# Patient Record
Sex: FEMALE | Race: WHITE
[De-identification: names, ages, dates, MRNs, and addresses within clinical notes are randomized per-mention and may not be internally consistent; named-entity substitution may affect disease eponyms.]

---

## 2017-07-27 ENCOUNTER — APPOINTMENT (OUTPATIENT)
Dept: CARDIOLOGY | Facility: CLINIC | Age: 22
End: 2017-07-27

## 2017-10-30 ENCOUNTER — EMERGENCY (EMERGENCY)
Facility: HOSPITAL | Age: 22
LOS: 0 days | Discharge: HOME | End: 2017-10-30
Attending: EMERGENCY MEDICINE | Admitting: EMERGENCY MEDICINE
Payer: MEDICAID

## 2017-10-30 VITALS
RESPIRATION RATE: 18 BRPM | TEMPERATURE: 98 F | DIASTOLIC BLOOD PRESSURE: 76 MMHG | OXYGEN SATURATION: 98 % | HEART RATE: 79 BPM | SYSTOLIC BLOOD PRESSURE: 122 MMHG

## 2017-10-30 VITALS — HEIGHT: 64 IN | WEIGHT: 259.93 LBS

## 2017-10-30 DIAGNOSIS — O9A.211 INJURY, POISONING AND CERTAIN OTHER CONSEQUENCES OF EXTERNAL CAUSES COMPLICATING PREGNANCY, FIRST TRIMESTER: ICD-10-CM

## 2017-10-30 DIAGNOSIS — S39.011A STRAIN OF MUSCLE, FASCIA AND TENDON OF ABDOMEN, INITIAL ENCOUNTER: ICD-10-CM
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
## 2017-10-30 PROCEDURE — 99283 EMERGENCY DEPT VISIT LOW MDM: CPT

## 2017-10-30 RX ORDER — ACETAMINOPHEN 500 MG
650 TABLET ORAL ONCE
Qty: 0 | Refills: 0 | Status: COMPLETED | OUTPATIENT
Start: 2017-10-30 | End: 2017-10-30

## 2017-10-30 RX ADMIN — Medication 650 MILLIGRAM(S): at 12:56

## 2017-10-30 NOTE — ED STATDOCS - PROGRESS NOTE DETAILS
21 yo female presents with lower abd pain s/p placing a pt into an wheelchair and didn't know the brakes were not applied and pulled her abdominal muscle causing pain. Pt thinks shes pregnant abc she took a home preg test which as positive this morning. -David Deleon PA-C

## 2017-10-30 NOTE — ED STATDOCS - MEDICAL DECISION MAKING DETAILS
Pt presents with back pain, likely musculoskeletal. Plan to check UA and give Tylenol for pain control.

## 2017-10-30 NOTE — ED STATDOCS - CARE PLAN
Principal Discharge DX:	Abdominal pain, unspecified abdominal location  Secondary Diagnosis:	Muscle strain Principal Discharge DX:	Abdominal pain, unspecified abdominal location  Secondary Diagnosis:	Muscle strain  Secondary Diagnosis:	Less than 8 weeks gestation of pregnancy

## 2017-10-30 NOTE — ED STATDOCS - OBJECTIVE STATEMENT
21 y/o F with no PMHx. Pt was helping a heavy Pt into a wheelchair yesterday and starting experiencing some back pain. Pain is exacerbated with movement. Pt took a home pregnancy test this morning and it was positive. LNMP was a month ago. NKDA. Dr. Sargent, PMD.

## 2018-01-03 ENCOUNTER — APPOINTMENT (OUTPATIENT)
Dept: ANTEPARTUM | Facility: CLINIC | Age: 23
End: 2018-01-03
Payer: COMMERCIAL

## 2018-01-03 ENCOUNTER — ASOB RESULT (OUTPATIENT)
Age: 23
End: 2018-01-03

## 2018-01-03 PROCEDURE — 76813 OB US NUCHAL MEAS 1 GEST: CPT

## 2018-01-03 PROCEDURE — 76801 OB US < 14 WKS SINGLE FETUS: CPT

## 2018-02-21 ENCOUNTER — ASOB RESULT (OUTPATIENT)
Age: 23
End: 2018-02-21

## 2018-02-21 ENCOUNTER — APPOINTMENT (OUTPATIENT)
Dept: ANTEPARTUM | Facility: CLINIC | Age: 23
End: 2018-02-21
Payer: MEDICAID

## 2018-02-21 PROCEDURE — 76811 OB US DETAILED SNGL FETUS: CPT

## 2018-02-21 PROCEDURE — 76817 TRANSVAGINAL US OBSTETRIC: CPT

## 2018-05-22 ENCOUNTER — ASOB RESULT (OUTPATIENT)
Age: 23
End: 2018-05-22

## 2018-05-22 ENCOUNTER — APPOINTMENT (OUTPATIENT)
Dept: ANTEPARTUM | Facility: CLINIC | Age: 23
End: 2018-05-22
Payer: MEDICAID

## 2018-05-22 PROCEDURE — 76816 OB US FOLLOW-UP PER FETUS: CPT

## 2018-05-22 PROCEDURE — 76820 UMBILICAL ARTERY ECHO: CPT

## 2018-06-25 ENCOUNTER — ASOB RESULT (OUTPATIENT)
Age: 23
End: 2018-06-25

## 2018-06-25 ENCOUNTER — APPOINTMENT (OUTPATIENT)
Dept: ANTEPARTUM | Facility: CLINIC | Age: 23
End: 2018-06-25
Payer: MEDICAID

## 2018-06-25 PROCEDURE — 76816 OB US FOLLOW-UP PER FETUS: CPT

## 2018-06-25 PROCEDURE — 76819 FETAL BIOPHYS PROFIL W/O NST: CPT

## 2018-06-25 PROCEDURE — 76820 UMBILICAL ARTERY ECHO: CPT

## 2019-02-23 ENCOUNTER — EMERGENCY (EMERGENCY)
Facility: HOSPITAL | Age: 24
LOS: 0 days | Discharge: HOME | End: 2019-02-23
Attending: EMERGENCY MEDICINE | Admitting: EMERGENCY MEDICINE
Payer: MEDICAID

## 2019-02-23 VITALS
SYSTOLIC BLOOD PRESSURE: 114 MMHG | OXYGEN SATURATION: 100 % | DIASTOLIC BLOOD PRESSURE: 64 MMHG | RESPIRATION RATE: 18 BRPM | TEMPERATURE: 98 F | HEART RATE: 71 BPM

## 2019-02-23 VITALS — WEIGHT: 270.07 LBS | HEIGHT: 64 IN

## 2019-02-23 DIAGNOSIS — R10.13 EPIGASTRIC PAIN: ICD-10-CM

## 2019-02-23 DIAGNOSIS — O26.899 OTHER SPECIFIED PREGNANCY RELATED CONDITIONS, UNSPECIFIED TRIMESTER: ICD-10-CM

## 2019-02-23 DIAGNOSIS — Z3A.01 LESS THAN 8 WEEKS GESTATION OF PREGNANCY: ICD-10-CM

## 2019-02-23 LAB
ALBUMIN SERPL ELPH-MCNC: 3.3 G/DL — SIGNIFICANT CHANGE UP (ref 3.3–5)
ALP SERPL-CCNC: 100 U/L — SIGNIFICANT CHANGE UP (ref 40–120)
ALT FLD-CCNC: 54 U/L — SIGNIFICANT CHANGE UP (ref 12–78)
ANION GAP SERPL CALC-SCNC: 10 MMOL/L — SIGNIFICANT CHANGE UP (ref 5–17)
APPEARANCE UR: CLEAR — SIGNIFICANT CHANGE UP
APTT BLD: 33.3 SEC — SIGNIFICANT CHANGE UP (ref 27.5–36.3)
AST SERPL-CCNC: 41 U/L — HIGH (ref 15–37)
BACTERIA # UR AUTO: ABNORMAL
BASOPHILS # BLD AUTO: 0.07 K/UL — SIGNIFICANT CHANGE UP (ref 0–0.2)
BASOPHILS NFR BLD AUTO: 0.4 % — SIGNIFICANT CHANGE UP (ref 0–2)
BILIRUB SERPL-MCNC: 0.5 MG/DL — SIGNIFICANT CHANGE UP (ref 0.2–1.2)
BILIRUB UR-MCNC: NEGATIVE — SIGNIFICANT CHANGE UP
BUN SERPL-MCNC: 10 MG/DL — SIGNIFICANT CHANGE UP (ref 7–23)
CALCIUM SERPL-MCNC: 8.6 MG/DL — SIGNIFICANT CHANGE UP (ref 8.5–10.1)
CHLORIDE SERPL-SCNC: 106 MMOL/L — SIGNIFICANT CHANGE UP (ref 96–108)
CO2 SERPL-SCNC: 23 MMOL/L — SIGNIFICANT CHANGE UP (ref 22–31)
COLOR SPEC: YELLOW — SIGNIFICANT CHANGE UP
CREAT SERPL-MCNC: 0.81 MG/DL — SIGNIFICANT CHANGE UP (ref 0.5–1.3)
DIFF PNL FLD: ABNORMAL
EOSINOPHIL # BLD AUTO: 0.03 K/UL — SIGNIFICANT CHANGE UP (ref 0–0.5)
EOSINOPHIL NFR BLD AUTO: 0.2 % — SIGNIFICANT CHANGE UP (ref 0–6)
GLUCOSE SERPL-MCNC: 110 MG/DL — HIGH (ref 70–99)
GLUCOSE UR QL: NEGATIVE MG/DL — SIGNIFICANT CHANGE UP
HCG SERPL-ACNC: 8196.47 MIU/ML — SIGNIFICANT CHANGE UP
HCT VFR BLD CALC: 41.3 % — SIGNIFICANT CHANGE UP (ref 34.5–45)
HGB BLD-MCNC: 13.2 G/DL — SIGNIFICANT CHANGE UP (ref 11.5–15.5)
IMM GRANULOCYTES NFR BLD AUTO: 0.6 % — SIGNIFICANT CHANGE UP (ref 0–1.5)
INR BLD: 1.06 RATIO — SIGNIFICANT CHANGE UP (ref 0.88–1.16)
KETONES UR-MCNC: ABNORMAL
LEUKOCYTE ESTERASE UR-ACNC: ABNORMAL
LIDOCAIN IGE QN: 108 U/L — SIGNIFICANT CHANGE UP (ref 73–393)
LYMPHOCYTES # BLD AUTO: 17.4 % — SIGNIFICANT CHANGE UP (ref 13–44)
LYMPHOCYTES # BLD AUTO: 3.15 K/UL — SIGNIFICANT CHANGE UP (ref 1–3.3)
MCHC RBC-ENTMCNC: 28 PG — SIGNIFICANT CHANGE UP (ref 27–34)
MCHC RBC-ENTMCNC: 32 GM/DL — SIGNIFICANT CHANGE UP (ref 32–36)
MCV RBC AUTO: 87.7 FL — SIGNIFICANT CHANGE UP (ref 80–100)
MONOCYTES # BLD AUTO: 0.89 K/UL — SIGNIFICANT CHANGE UP (ref 0–0.9)
MONOCYTES NFR BLD AUTO: 4.9 % — SIGNIFICANT CHANGE UP (ref 2–14)
NEUTROPHILS # BLD AUTO: 13.84 K/UL — HIGH (ref 1.8–7.4)
NEUTROPHILS NFR BLD AUTO: 76.5 % — SIGNIFICANT CHANGE UP (ref 43–77)
NITRITE UR-MCNC: NEGATIVE — SIGNIFICANT CHANGE UP
NRBC # BLD: 0 /100 WBCS — SIGNIFICANT CHANGE UP (ref 0–0)
PH UR: 6 — SIGNIFICANT CHANGE UP (ref 5–8)
PLATELET # BLD AUTO: 432 K/UL — HIGH (ref 150–400)
POTASSIUM SERPL-MCNC: 4 MMOL/L — SIGNIFICANT CHANGE UP (ref 3.5–5.3)
POTASSIUM SERPL-SCNC: 4 MMOL/L — SIGNIFICANT CHANGE UP (ref 3.5–5.3)
PROT SERPL-MCNC: 8 GM/DL — SIGNIFICANT CHANGE UP (ref 6–8.3)
PROT UR-MCNC: 15 MG/DL
PROTHROM AB SERPL-ACNC: 11.8 SEC — SIGNIFICANT CHANGE UP (ref 10–12.9)
RBC # BLD: 4.71 M/UL — SIGNIFICANT CHANGE UP (ref 3.8–5.2)
RBC # FLD: 14.7 % — HIGH (ref 10.3–14.5)
RBC CASTS # UR COMP ASSIST: ABNORMAL /HPF (ref 0–4)
SODIUM SERPL-SCNC: 139 MMOL/L — SIGNIFICANT CHANGE UP (ref 135–145)
SP GR SPEC: 1.02 — SIGNIFICANT CHANGE UP (ref 1.01–1.02)
UROBILINOGEN FLD QL: NEGATIVE MG/DL — SIGNIFICANT CHANGE UP
WBC # BLD: 18.09 K/UL — HIGH (ref 3.8–10.5)
WBC # FLD AUTO: 18.09 K/UL — HIGH (ref 3.8–10.5)
WBC UR QL: SIGNIFICANT CHANGE UP

## 2019-02-23 PROCEDURE — 76815 OB US LIMITED FETUS(S): CPT | Mod: 26

## 2019-02-23 PROCEDURE — 99285 EMERGENCY DEPT VISIT HI MDM: CPT | Mod: 25

## 2019-02-23 PROCEDURE — 76705 ECHO EXAM OF ABDOMEN: CPT | Mod: 26

## 2019-02-23 RX ORDER — SODIUM CHLORIDE 9 MG/ML
1000 INJECTION INTRAMUSCULAR; INTRAVENOUS; SUBCUTANEOUS ONCE
Qty: 0 | Refills: 0 | Status: COMPLETED | OUTPATIENT
Start: 2019-02-23 | End: 2019-02-23

## 2019-02-23 RX ORDER — ACETAMINOPHEN 500 MG
650 TABLET ORAL ONCE
Qty: 0 | Refills: 0 | Status: COMPLETED | OUTPATIENT
Start: 2019-02-23 | End: 2019-02-23

## 2019-02-23 RX ORDER — FAMOTIDINE 10 MG/ML
20 INJECTION INTRAVENOUS ONCE
Qty: 0 | Refills: 0 | Status: COMPLETED | OUTPATIENT
Start: 2019-02-23 | End: 2019-02-23

## 2019-02-23 RX ADMIN — SODIUM CHLORIDE 1000 MILLILITER(S): 9 INJECTION INTRAMUSCULAR; INTRAVENOUS; SUBCUTANEOUS at 13:07

## 2019-02-23 RX ADMIN — SODIUM CHLORIDE 1000 MILLILITER(S): 9 INJECTION INTRAMUSCULAR; INTRAVENOUS; SUBCUTANEOUS at 14:07

## 2019-02-23 RX ADMIN — FAMOTIDINE 20 MILLIGRAM(S): 10 INJECTION INTRAVENOUS at 13:07

## 2019-02-23 RX ADMIN — Medication 650 MILLIGRAM(S): at 13:08

## 2019-02-23 NOTE — ED ADULT TRIAGE NOTE - CHIEF COMPLAINT QUOTE
pt reports to ed ambulating with steady gait. pt c/o severe upper/mid abdominal pain that started 40 minutes ago. pt reports vomiting x2, no relief. pt reports being pregnant uknown due date/weeks pregnant at this time as per pt. pt gave birth 7 months ago via . pt denies fever,chills, diarrhea, sob, chest pain.

## 2019-02-23 NOTE — ED STATDOCS - OBJECTIVE STATEMENT
22 y/o female with PMHx of appy, s/p  presents to the ED c/o RUQ abd pain. Reports that pain aggravated after PO intake. No prior episodes. LNMP in December. Pt 8 weeks pregnant. Pt post-partum 7 months. .

## 2019-02-23 NOTE — ED STATDOCS - PROGRESS NOTE DETAILS
LINDEN Lamar:   Patient has been seen, evaluated and orders have been written by the attending in intake. Patient is stable.  I will follow up the results of orders written and I will continue to evaluate/observe the patient.  .cristopher e No pain at DC.  Neg. vaginal bleeding or pelvic pain.  Neg. N/V.  Pt. instructed to return to ED for any worsening symptoms.  Leny Lamar PA-C No pain at DC.  Neg. vaginal bleeding or pelvic pain.  Neg. N/V.  Pt. instructed to return to ED for any worsening symptoms.  Copies of US and labs provided to patient.  Leny Lamar PA-C Pt. has appt. with ob in two days.  Leny Lamar PA-C

## 2019-02-23 NOTE — ED STATDOCS - CLINICAL SUMMARY MEDICAL DECISION MAKING FREE TEXT BOX
Patient with epigastric pain.  US no signs of biliary disease.  US OB shows single live IUG.  WBC 18, but no other signs of infection.  Pt treated with tylenol and pepcid, with improvement in pain on reexam.  Question 2/2 gastritis.  Okay for d/c home with syptomatic care and f/u closely with PMD, and OB.

## 2019-02-24 LAB
CULTURE RESULTS: SIGNIFICANT CHANGE UP
SPECIMEN SOURCE: SIGNIFICANT CHANGE UP

## 2019-04-01 ENCOUNTER — ASOB RESULT (OUTPATIENT)
Age: 24
End: 2019-04-01

## 2019-04-01 ENCOUNTER — APPOINTMENT (OUTPATIENT)
Age: 24
End: 2019-04-01
Payer: MEDICAID

## 2019-04-01 PROCEDURE — 76813 OB US NUCHAL MEAS 1 GEST: CPT | Mod: TH

## 2019-04-01 PROCEDURE — 76801 OB US < 14 WKS SINGLE FETUS: CPT | Mod: TH

## 2019-06-04 ENCOUNTER — APPOINTMENT (OUTPATIENT)
Dept: ANTEPARTUM | Facility: CLINIC | Age: 24
End: 2019-06-04
Payer: MEDICAID

## 2019-06-04 ENCOUNTER — ASOB RESULT (OUTPATIENT)
Age: 24
End: 2019-06-04

## 2019-06-04 PROCEDURE — 76811 OB US DETAILED SNGL FETUS: CPT

## 2019-06-04 PROCEDURE — 76817 TRANSVAGINAL US OBSTETRIC: CPT

## 2019-06-11 ENCOUNTER — ASOB RESULT (OUTPATIENT)
Age: 24
End: 2019-06-11

## 2019-06-11 ENCOUNTER — APPOINTMENT (OUTPATIENT)
Dept: ANTEPARTUM | Facility: CLINIC | Age: 24
End: 2019-06-11
Payer: MEDICAID

## 2019-06-11 PROCEDURE — 76816 OB US FOLLOW-UP PER FETUS: CPT

## 2019-06-13 ENCOUNTER — APPOINTMENT (OUTPATIENT)
Dept: PEDIATRIC CARDIOLOGY | Facility: CLINIC | Age: 24
End: 2019-06-13
Payer: MEDICAID

## 2019-06-13 DIAGNOSIS — Z3A.21 21 WEEKS GESTATION OF PREGNANCY: ICD-10-CM

## 2019-06-13 PROCEDURE — 99204 OFFICE O/P NEW MOD 45 MIN: CPT | Mod: 25

## 2019-06-13 PROCEDURE — 93325 DOPPLER ECHO COLOR FLOW MAPG: CPT | Mod: 59

## 2019-06-13 PROCEDURE — 76821 MIDDLE CEREBRAL ARTERY ECHO: CPT | Mod: 59

## 2019-06-13 PROCEDURE — 76827 ECHO EXAM OF FETAL HEART: CPT

## 2019-06-13 PROCEDURE — 76825 ECHO EXAM OF FETAL HEART: CPT

## 2019-06-13 PROCEDURE — 76820 UMBILICAL ARTERY ECHO: CPT | Mod: 59

## 2019-06-13 RX ORDER — ELASTIC BANDAGE 2"X2.2YD
BANDAGE TOPICAL
Refills: 0 | Status: ACTIVE | COMMUNITY

## 2019-07-11 ENCOUNTER — APPOINTMENT (OUTPATIENT)
Dept: PEDIATRIC CARDIOLOGY | Facility: CLINIC | Age: 24
End: 2019-07-11

## 2019-07-19 ENCOUNTER — APPOINTMENT (OUTPATIENT)
Dept: PEDIATRIC CARDIOLOGY | Facility: CLINIC | Age: 24
End: 2019-07-19
Payer: MEDICAID

## 2019-07-19 DIAGNOSIS — O99.212 OBESITY COMPLICATING PREGNANCY, SECOND TRIMESTER: ICD-10-CM

## 2019-07-19 DIAGNOSIS — O35.2XX0 MATERNAL CARE FOR (SUSPECTED) HEREDITARY DISEASE IN FETUS, NOT APPLICABLE OR UNSPECIFIED: ICD-10-CM

## 2019-07-19 DIAGNOSIS — Z3A.26 26 WEEKS GESTATION OF PREGNANCY: ICD-10-CM

## 2019-07-19 DIAGNOSIS — O28.5 ABNORMAL CHROMOSOMAL AND GENETIC FINDING ON ANTENATAL SCREENING OF MOTHER: ICD-10-CM

## 2019-07-19 DIAGNOSIS — O35.9XX0 MATERNAL CARE FOR (SUSPECTED) FETAL ABNORMALITY AND DAMAGE, UNSPECIFIED, NOT APPLICABLE OR UNSPECIFIED: ICD-10-CM

## 2019-07-19 PROCEDURE — 76821 MIDDLE CEREBRAL ARTERY ECHO: CPT

## 2019-07-19 PROCEDURE — 76826 ECHO EXAM OF FETAL HEART: CPT

## 2019-07-19 PROCEDURE — 76820 UMBILICAL ARTERY ECHO: CPT

## 2019-07-19 PROCEDURE — 93325 DOPPLER ECHO COLOR FLOW MAPG: CPT | Mod: 59

## 2019-07-19 PROCEDURE — 76828 ECHO EXAM OF FETAL HEART: CPT

## 2019-07-19 PROCEDURE — 99214 OFFICE O/P EST MOD 30 MIN: CPT | Mod: 25

## 2019-09-11 ENCOUNTER — ASOB RESULT (OUTPATIENT)
Age: 24
End: 2019-09-11

## 2019-09-11 ENCOUNTER — APPOINTMENT (OUTPATIENT)
Dept: ANTEPARTUM | Facility: CLINIC | Age: 24
End: 2019-09-11
Payer: MEDICAID

## 2019-09-11 PROCEDURE — 76817 TRANSVAGINAL US OBSTETRIC: CPT

## 2019-09-11 PROCEDURE — 76819 FETAL BIOPHYS PROFIL W/O NST: CPT

## 2019-09-11 PROCEDURE — 76816 OB US FOLLOW-UP PER FETUS: CPT

## 2019-09-11 PROCEDURE — 76821 MIDDLE CEREBRAL ARTERY ECHO: CPT

## 2019-09-20 ENCOUNTER — ASOB RESULT (OUTPATIENT)
Age: 24
End: 2019-09-20

## 2019-09-20 ENCOUNTER — APPOINTMENT (OUTPATIENT)
Dept: ANTEPARTUM | Facility: CLINIC | Age: 24
End: 2019-09-20
Payer: MEDICAID

## 2019-09-20 PROCEDURE — 76819 FETAL BIOPHYS PROFIL W/O NST: CPT

## 2019-09-20 PROCEDURE — 76816 OB US FOLLOW-UP PER FETUS: CPT

## 2019-09-25 ENCOUNTER — ASOB RESULT (OUTPATIENT)
Age: 24
End: 2019-09-25

## 2019-09-25 ENCOUNTER — APPOINTMENT (OUTPATIENT)
Dept: ANTEPARTUM | Facility: CLINIC | Age: 24
End: 2019-09-25
Payer: MEDICAID

## 2019-09-25 PROCEDURE — 76816 OB US FOLLOW-UP PER FETUS: CPT

## 2019-09-25 PROCEDURE — 76819 FETAL BIOPHYS PROFIL W/O NST: CPT

## 2019-10-02 ENCOUNTER — ASOB RESULT (OUTPATIENT)
Age: 24
End: 2019-10-02

## 2019-10-02 ENCOUNTER — APPOINTMENT (OUTPATIENT)
Dept: ANTEPARTUM | Facility: CLINIC | Age: 24
End: 2019-10-02
Payer: MEDICAID

## 2019-10-02 PROCEDURE — 76821 MIDDLE CEREBRAL ARTERY ECHO: CPT

## 2019-10-02 PROCEDURE — 76819 FETAL BIOPHYS PROFIL W/O NST: CPT

## 2019-10-08 ENCOUNTER — APPOINTMENT (OUTPATIENT)
Dept: ANTEPARTUM | Facility: CLINIC | Age: 24
End: 2019-10-08
Payer: MEDICAID

## 2019-10-08 ENCOUNTER — ASOB RESULT (OUTPATIENT)
Age: 24
End: 2019-10-08

## 2019-10-08 PROCEDURE — 76821 MIDDLE CEREBRAL ARTERY ECHO: CPT

## 2019-10-08 PROCEDURE — 76816 OB US FOLLOW-UP PER FETUS: CPT

## 2019-10-08 PROCEDURE — 76819 FETAL BIOPHYS PROFIL W/O NST: CPT

## 2020-02-17 NOTE — ED STATDOCS - NS ED ATTENDING STATEMENT MOD
Patient I have personally performed a face to face diagnostic evaluation on this patient. I have reviewed the ACP note and agree with the history, exam and plan of care, except as noted.

## 2021-03-12 NOTE — ED STATDOCS - PRINCIPAL DIAGNOSIS
refer to Dr. Chavez Moser for pain management. Include Z78.9 (Other Specified Conditions Influencing Health Status) As An Associated Diagnosis?: No Detail Level: Detailed Medical Necessity Information: It is in your best interest to select a reason for this procedure from the list below. All of these items fulfill various CMS LCD requirements except the new and changing color options. Post-Care Instructions: I reviewed with the patient in detail post-care instructions. Patient is to wear sunprotection, and avoid picking at any of the treated lesions. Pt may apply Vaseline to crusted or scabbing areas. Medical Necessity Clause: This procedure was medically necessary because the lesions that were treated were: itchy Consent: The patient's consent was obtained including but not limited to risks of crusting, scabbing, blistering, scarring, darker or lighter pigmentary change, recurrence, incomplete removal and infection. Epigastric pain

## 2021-12-02 ENCOUNTER — EMERGENCY (EMERGENCY)
Facility: HOSPITAL | Age: 26
LOS: 0 days | Discharge: HOME | End: 2021-12-02
Attending: EMERGENCY MEDICINE
Payer: MEDICAID

## 2021-12-02 VITALS
HEIGHT: 64 IN | OXYGEN SATURATION: 99 % | HEART RATE: 78 BPM | WEIGHT: 179.9 LBS | RESPIRATION RATE: 18 BRPM | TEMPERATURE: 97 F | DIASTOLIC BLOOD PRESSURE: 68 MMHG | SYSTOLIC BLOOD PRESSURE: 109 MMHG

## 2021-12-02 DIAGNOSIS — S92.242A DISPLACED FRACTURE OF MEDIAL CUNEIFORM OF LEFT FOOT, INITIAL ENCOUNTER FOR CLOSED FRACTURE: ICD-10-CM

## 2021-12-02 DIAGNOSIS — K59.00 CONSTIPATION, UNSPECIFIED: ICD-10-CM

## 2021-12-02 DIAGNOSIS — M79.672 PAIN IN LEFT FOOT: ICD-10-CM

## 2021-12-02 DIAGNOSIS — Z87.19 PERSONAL HISTORY OF OTHER DISEASES OF THE DIGESTIVE SYSTEM: ICD-10-CM
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
## 2021-12-02 PROCEDURE — 73630 X-RAY EXAM OF FOOT: CPT | Mod: 26,LT

## 2021-12-02 PROCEDURE — 99284 EMERGENCY DEPT VISIT MOD MDM: CPT | Mod: 25

## 2021-12-02 PROCEDURE — 29515 APPLICATION SHORT LEG SPLINT: CPT | Mod: LT

## 2021-12-02 PROCEDURE — 99283 EMERGENCY DEPT VISIT LOW MDM: CPT | Mod: 25

## 2021-12-02 PROCEDURE — 29515 APPLICATION SHORT LEG SPLINT: CPT

## 2021-12-02 PROCEDURE — 73630 X-RAY EXAM OF FOOT: CPT | Mod: LT

## 2021-12-02 RX ORDER — IBUPROFEN 200 MG
600 TABLET ORAL ONCE
Refills: 0 | Status: COMPLETED | OUTPATIENT
Start: 2021-12-02 | End: 2021-12-02

## 2021-12-02 RX ADMIN — Medication 600 MILLIGRAM(S): at 21:05

## 2021-12-02 NOTE — ED STATDOCS - PROGRESS NOTE DETAILS
Reviewed results of X-ray left foot with patient. + fracture of left cuneiform. Posterior splint applied to left lower extremity. Crutch walking instructions given. Je DEXTER Agreed to F/U with Orthopedic. Je DEXTER

## 2021-12-02 NOTE — ED STATDOCS - CARE PROVIDERS DIRECT ADDRESSES
[email or web address]

## 2021-12-02 NOTE — ED STATDOCS - NSFOLLOWUPINSTRUCTIONS_ED_ALL_ED_FT
Rest. Elevate affected foot.   Maintain splint to left foot.  Keep lean and dry.  Ibuprofen 600 mg. PO every 6 hrs. for pain.  Follow up with Podiatrist or Orthopedic.                   Tarsal Fracture       A tarsal fracture is a break in one of the bones that are located in the middle and back of your foot (tarsals). There are seven tarsal bones in your foot that make up your heel, the arch of your foot, and connections to the long bones of your toes.  Types of tarsal fractures include:  •Cracks in a bone (stress fracture).      •A broken bone that has not moved out of place (nondisplaced fracture).      •A broken bone that has moved out of place (displaced fracture).      •Breaks that result in three or more bone pieces (comminuted fracture).        What are the causes?  This condition may be caused by:  •Repeated stress on the tarsal bones over time.      •An injury that forcefully twists your foot.      •Falling from a height.      •A hard, direct hit or a crushing injury to your foot.        What increases the risk?  You are more likely to develop this condition if:  •You have weak bones (osteopenia or osteoporosis).      •You start a new athletic activity or try to advance too quickly.      •You play certain sports, such as soccer, basketball, gymnastics, or football.        What are the signs or symptoms?  Foot pain is the most common symptom of this condition. The pain is generally:  •Achy, dull, or vague.      •Better with rest and worse with activity.      •Increased when you lean your body weight on your foot or rise up on tiptoe.    Other symptoms include:  •Swelling.      •Bruising.      •Pain when pressing on your foot (tenderness).        How is this diagnosed?  This condition may be diagnosed with:  •Medical history.      •Symptoms.      •Physical exam.      •Imaging tests, such as X-ray and an MRI.        How is this treated?  Treatment for this condition depends on the severity of the injury.•For stress and nondisplaced fractures, you may be given:  •A boot or cast.      •Crutches.      •Physical therapy.      •Medicine for pain.      •For displaced and comminuted fractures, you may have:  •Surgery.      •A cast.      •Physical therapy.          Follow these instructions at home:    If you have a boot:     •Wear it as told by your health care provider. Remove it only as told by your health care provider.      • Loosen it if your toes tingle, become numb, or turn cold and blue.      •Keep it clean and dry.      If you have a cast:     • Do not put pressure on any part of the cast until it is fully hardened. This may take several hours.      • Do not stick anything inside the cast to scratch your skin. Doing that increases your risk of infection.      •Check the skin around the cast every day. Tell your health care provider about any concerns.       • You may put lotion on dry skin around the edges of the cast. Do not put lotion on the skin underneath the cast.      •Keep it clean and dry.      Bathing     • Do not take baths, swim, or use a hot tub until your health care provider approves.    •If the boot or cast is not waterproof:  •Do not let it get wet.      •Cover it with a waterproof covering when you take a bath or shower.        Activity     • Do not use the injured limb to support your body weight until your health care provider says that you can. Use your crutches as told by your health care provider.      •Ask your health care provider when it is safe to drive if you have a boot or cast on your foot.      •Do exercises as told by your health care provider.      •Return to your normal activities as told by your health care provider. Ask your health care provider what activities are safe for you.        Managing pain, stiffness, and swelling    •If directed, put ice on the injured area.  •Put ice in a plastic bag.      •Place a towel between the bag and your boot, cast, or skin.      •Leave the ice on for 20 minutes, 2–3 times a day.        •Move your toes often to reduce stiffness and swelling.      •Raise (elevate) the injured area above the level of your heart while you are sitting or lying down.      General instructions     •Take over-the-counter and prescription medicines only as told by your health care provider.    •Ask your health care provider if the medicine prescribed to you:  •Requires you to avoid driving or using heavy machinery.    •Can cause constipation. You may need to take actions to prevent or treat constipation, such as:  •Drink enough fluid to keep your urine pale yellow.      •Take over-the-counter or prescription medicines.      •Eat foods that are high in fiber, such as beans, whole grains, and fresh fruits and vegetables.      •Limit foods that are high in fat and processed sugars, such as fried or sweet foods.          • Do not use any products that contain nicotine or tobacco, such as cigarettes, e-cigarettes, and chewing tobacco. These can delay bone healing. If you need help quitting, ask your health care provider.      •Keep all follow-up visits as told by your health care provider. This is important.        How is this prevented?    •Wear comfortable and supportive footwear during activity.      •If you are starting a new activity, build your time or distance gradually.      •Make sure to use equipment that fits you.    •Maintain physical fitness, including:  •Strength.      •Flexibility.        •Do alternative physical activities (cross-train) to avoid overstressing one area of your body.      •Eat a balanced diet, including foods that have calcium and vitamin D. These include milk, beef liver, egg yolks, fatty fish, soybeans, dark leafy greens, cheese, and fortified cereals.        Contact a health care provider if:    •Your pain medicine is not helping.      •Your boot or cast gets damaged.        Get help right away if:    •You have severe pain.      •Your toes turn pale and cold or blue.      •You lose feeling in your toes.      •You have redness, warmth, and tenderness in the back of your leg.      •You have chest pain or difficulty breathing.        Summary    •A tarsal fracture is a break in one of the bones that are located in the middle and back of your foot (tarsals).      •It is caused by repeated stress, injury, a fall, or a direct hit to the foot.      •It is treated with a boot or cast, crutches, medicines, physical therapy, or surgery.      This information is not intended to replace advice given to you by your health care provider. Make sure you discuss any questions you have with your health care provider. Rest. Elevate affected foot.   Maintain splint to left foot.  Keep lean and dry.  Ibuprofen 600 mg. PO every 6 hrs. for pain.  Crutch Walking.  Follow up with Podiatrist or Orthopedic.                   Tarsal Fracture       A tarsal fracture is a break in one of the bones that are located in the middle and back of your foot (tarsals). There are seven tarsal bones in your foot that make up your heel, the arch of your foot, and connections to the long bones of your toes.  Types of tarsal fractures include:  •Cracks in a bone (stress fracture).      •A broken bone that has not moved out of place (nondisplaced fracture).      •A broken bone that has moved out of place (displaced fracture).      •Breaks that result in three or more bone pieces (comminuted fracture).        What are the causes?  This condition may be caused by:  •Repeated stress on the tarsal bones over time.      •An injury that forcefully twists your foot.      •Falling from a height.      •A hard, direct hit or a crushing injury to your foot.        What increases the risk?  You are more likely to develop this condition if:  •You have weak bones (osteopenia or osteoporosis).      •You start a new athletic activity or try to advance too quickly.      •You play certain sports, such as soccer, basketball, gymnastics, or football.        What are the signs or symptoms?  Foot pain is the most common symptom of this condition. The pain is generally:  •Achy, dull, or vague.      •Better with rest and worse with activity.      •Increased when you lean your body weight on your foot or rise up on tiptoe.    Other symptoms include:  •Swelling.      •Bruising.      •Pain when pressing on your foot (tenderness).        How is this diagnosed?  This condition may be diagnosed with:  •Medical history.      •Symptoms.      •Physical exam.      •Imaging tests, such as X-ray and an MRI.        How is this treated?  Treatment for this condition depends on the severity of the injury.•For stress and nondisplaced fractures, you may be given:  •A boot or cast.      •Crutches.      •Physical therapy.      •Medicine for pain.      •For displaced and comminuted fractures, you may have:  •Surgery.      •A cast.      •Physical therapy.          Follow these instructions at home:    If you have a boot:     •Wear it as told by your health care provider. Remove it only as told by your health care provider.      • Loosen it if your toes tingle, become numb, or turn cold and blue.      •Keep it clean and dry.      If you have a cast:     • Do not put pressure on any part of the cast until it is fully hardened. This may take several hours.      • Do not stick anything inside the cast to scratch your skin. Doing that increases your risk of infection.      •Check the skin around the cast every day. Tell your health care provider about any concerns.       • You may put lotion on dry skin around the edges of the cast. Do not put lotion on the skin underneath the cast.      •Keep it clean and dry.      Bathing     • Do not take baths, swim, or use a hot tub until your health care provider approves.    •If the boot or cast is not waterproof:  •Do not let it get wet.      •Cover it with a waterproof covering when you take a bath or shower.        Activity     • Do not use the injured limb to support your body weight until your health care provider says that you can. Use your crutches as told by your health care provider.      •Ask your health care provider when it is safe to drive if you have a boot or cast on your foot.      •Do exercises as told by your health care provider.      •Return to your normal activities as told by your health care provider. Ask your health care provider what activities are safe for you.        Managing pain, stiffness, and swelling    •If directed, put ice on the injured area.  •Put ice in a plastic bag.      •Place a towel between the bag and your boot, cast, or skin.      •Leave the ice on for 20 minutes, 2–3 times a day.        •Move your toes often to reduce stiffness and swelling.      •Raise (elevate) the injured area above the level of your heart while you are sitting or lying down.      General instructions     •Take over-the-counter and prescription medicines only as told by your health care provider.    •Ask your health care provider if the medicine prescribed to you:  •Requires you to avoid driving or using heavy machinery.    •Can cause constipation. You may need to take actions to prevent or treat constipation, such as:  •Drink enough fluid to keep your urine pale yellow.      •Take over-the-counter or prescription medicines.      •Eat foods that are high in fiber, such as beans, whole grains, and fresh fruits and vegetables.      •Limit foods that are high in fat and processed sugars, such as fried or sweet foods.          • Do not use any products that contain nicotine or tobacco, such as cigarettes, e-cigarettes, and chewing tobacco. These can delay bone healing. If you need help quitting, ask your health care provider.      •Keep all follow-up visits as told by your health care provider. This is important.        How is this prevented?    •Wear comfortable and supportive footwear during activity.      •If you are starting a new activity, build your time or distance gradually.      •Make sure to use equipment that fits you.    •Maintain physical fitness, including:  •Strength.      •Flexibility.        •Do alternative physical activities (cross-train) to avoid overstressing one area of your body.      •Eat a balanced diet, including foods that have calcium and vitamin D. These include milk, beef liver, egg yolks, fatty fish, soybeans, dark leafy greens, cheese, and fortified cereals.        Contact a health care provider if:    •Your pain medicine is not helping.      •Your boot or cast gets damaged.        Get help right away if:    •You have severe pain.      •Your toes turn pale and cold or blue.      •You lose feeling in your toes.      •You have redness, warmth, and tenderness in the back of your leg.      •You have chest pain or difficulty breathing.        Summary    •A tarsal fracture is a break in one of the bones that are located in the middle and back of your foot (tarsals).      •It is caused by repeated stress, injury, a fall, or a direct hit to the foot.      •It is treated with a boot or cast, crutches, medicines, physical therapy, or surgery.      This information is not intended to replace advice given to you by your health care provider. Make sure you discuss any questions you have with your health care provider.

## 2021-12-02 NOTE — ED STATDOCS - OBJECTIVE STATEMENT
27 y/o female with PMHx of appendicitis presents to the ED c/o left foot pain. Injured it getting out of car yesterday had been ambulatory. However today increased pain and unable to ambulate on it. Denies any other injuries or sx. Took Tylenol around 12pm.

## 2021-12-02 NOTE — ED STATDOCS - PATIENT PORTAL LINK FT
You can access the FollowMyHealth Patient Portal offered by [facility] by registering at the following website: [email or web address]. By joining [employer]’s FollowMyHealth portal, you will also be able to view your health information using other applications (apps) compatible with our system.

## 2021-12-02 NOTE — ED STATDOCS - CARE PROVIDER_API CALL
Vishnu Conrad (DO)  Orthopaedic Surgery  [address] [place] [address]  Phone: [phone]  Fax: [phone]  Follow Up Time:     Tabitha Davis  PODIATRIC MEDICINE AND SURGERY  [address], [address]  [place] [address]  Phone: [phone]  Fax: [phone]  Follow Up Time:

## 2021-12-04 ENCOUNTER — NON-APPOINTMENT (OUTPATIENT)
Age: 26
End: 2021-12-04

## 2021-12-07 ENCOUNTER — NON-APPOINTMENT (OUTPATIENT)
Age: 26
End: 2021-12-07

## 2021-12-08 ENCOUNTER — APPOINTMENT (OUTPATIENT)
Dept: ORTHOPEDIC SURGERY | Facility: CLINIC | Age: 26
End: 2021-12-08
Payer: MEDICAID

## 2021-12-08 DIAGNOSIS — S99.912A UNSPECIFIED INJURY OF LEFT ANKLE, INITIAL ENCOUNTER: ICD-10-CM

## 2021-12-08 DIAGNOSIS — M25.572 PAIN IN LEFT ANKLE AND JOINTS OF LEFT FOOT: ICD-10-CM

## 2021-12-08 PROCEDURE — 99204 OFFICE O/P NEW MOD 45 MIN: CPT

## 2021-12-08 NOTE — DISCUSSION/SUMMARY
[de-identified] : At this point in time I do want to go ahead with an MRI of the left ankle without contrast to evaluate for stress fracture of the navicular versus a posterior tibialis tendon tear.  The patient is having severe pain over the medial side of the ankle and concerned which is why I want to go ahead with the MRI.  The patient was provided with a Cam walking boot to help with her walking and pain.  In the interim for her pain she could use anti-inflammatories and Tylenol.  I do want her icing the area and elevating the area for swelling control.  Once the MRI is completed the patient is to follow-up in office to review.  I want her stopping all exercises until the MRI is completed and reviewed.  She understood the treatment course at this time.

## 2021-12-08 NOTE — PHYSICAL EXAM
[de-identified] : Left ankle Physical Examination:\par \par General: Alert and oriented x3.  In no acute distress.  Pleasant in nature with a normal affect.  No apparent respiratory distress. \par Erythema, Warmth, Rubor: Negative\par Swelling: Positive swelling on the medial side of the ankle over the posterior tibialis tendon and navicular area.\par \par ROM: With pain\par 1. Dorsiflexion: 10 degrees\par 2. Plantarflexion: 40 degrees\par 3. Inversion: 10 degrees\par 4. Eversion: 10 degrees\par \par Tenderness to Palpation: \par 1. Lateral Malleolus: Negative\par 2. Medial Malleolus: Negative\par 3. Proximal Fibular Pain: Negative\par 4. Heel Pain: Negative\par 5. Cuboid: Negative\par 6. Navicular: SEVERE tenderness to palpation over the navicular.\par 7. Tibiotalar Joint: Negative\par 8. Subtalar Joint: Negative\par 9. Posterior Recess: Negative\par \par Tendon Pain:\par 1. Achilles: Negative\par 2. Peroneals: Negative\par 3. Posterior Tibialis: SEVERE tenderness to palpation over the posterior tibialis tendon.\par 4. Tibialis Anterior: Negative\par \par Ligament Pain:\par 1. ATFL: Negative\par 2. CFL: Negative \par 3. PTFL: Negative\par 4. Deltoid Ligaments: Negative\par 5. Lis Franc Ligament: Negative\par \par Stability: \par 1. Anterior Drawer: Negative\par 2. Posterior Drawer: Negative\par \par Strength: 5/5 TA/GS/EHL\par \par Pulses: 2+ DP/PT Pulses\par \par Neuro: Intact motor and sensory\par \par Additional Test:\par 1. Calcaneal Squeeze Test: Negative\par 2. 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 Dec 3 2021 4:47PM

## 2021-12-15 ENCOUNTER — OUTPATIENT (OUTPATIENT)
Dept: OUTPATIENT SERVICES | Facility: HOSPITAL | Age: 26
LOS: 1 days | End: 2021-12-15
Payer: MEDICAID

## 2021-12-15 ENCOUNTER — APPOINTMENT (OUTPATIENT)
Dept: MRI IMAGING | Facility: CLINIC | Age: 26
End: 2021-12-15
Payer: MEDICAID

## 2021-12-15 DIAGNOSIS — S99.912A UNSPECIFIED INJURY OF LEFT ANKLE, INITIAL ENCOUNTER: ICD-10-CM

## 2021-12-15 DIAGNOSIS — M76.822 POSTERIOR TIBIAL TENDINITIS, LEFT LEG: ICD-10-CM

## 2021-12-15 DIAGNOSIS — M25.572 PAIN IN LEFT ANKLE AND JOINTS OF LEFT FOOT: ICD-10-CM

## 2021-12-15 PROCEDURE — 73721 MRI JNT OF LWR EXTRE W/O DYE: CPT

## 2021-12-15 PROCEDURE — 73721 MRI JNT OF LWR EXTRE W/O DYE: CPT | Mod: 26,LT

## 2021-12-20 ENCOUNTER — APPOINTMENT (OUTPATIENT)
Dept: ORTHOPEDIC SURGERY | Facility: CLINIC | Age: 26
End: 2021-12-20
Payer: MEDICAID

## 2021-12-20 DIAGNOSIS — M76.822 POSTERIOR TIBIAL TENDINITIS, LEFT LEG: ICD-10-CM

## 2021-12-20 PROCEDURE — 99214 OFFICE O/P EST MOD 30 MIN: CPT

## 2021-12-20 NOTE — PHYSICAL EXAM
[de-identified] : Left ankle Physical Examination:\par \par General: Alert and oriented x3.  In no acute distress.  Pleasant in nature with a normal affect.  No apparent respiratory distress. \par Erythema, Warmth, Rubor: Negative\par Swelling: Positive swelling on the medial side of the ankle over the posterior tibialis tendon and navicular area.\par \par ROM: With pain\par 1. Dorsiflexion: 10 degrees\par 2. Plantarflexion: 40 degrees\par 3. Inversion: 10 degrees\par 4. Eversion: 10 degrees\par \par Tenderness to Palpation: \par 1. Lateral Malleolus: Negative\par 2. Medial Malleolus: Negative\par 3. Proximal Fibular Pain: Negative\par 4. Heel Pain: Negative\par 5. Cuboid: Negative\par 6. Navicular: SEVERE tenderness to palpation over the navicular.\par 7. Tibiotalar Joint: Negative\par 8. Subtalar Joint: Negative\par 9. Posterior Recess: Negative\par \par Tendon Pain:\par 1. Achilles: Negative\par 2. Peroneals: Negative\par 3. Posterior Tibialis: SEVERE tenderness to palpation over the posterior tibialis tendon.\par 4. Tibialis Anterior: Negative\par \par Ligament Pain:\par 1. ATFL: Negative\par 2. CFL: Negative \par 3. PTFL: Negative\par 4. Deltoid Ligaments: Negative\par 5. Lis Franc Ligament: Negative\par \par Stability: \par 1. Anterior Drawer: Negative\par 2. Posterior Drawer: Negative\par \par Strength: 5/5 TA/GS/EHL\par \par Pulses: 2+ DP/PT Pulses\par \par Neuro: Intact motor and sensory\par \par Additional Test:\par 1. Calcaneal Squeeze Test: Negative\par 2. Syndesmosis Squeeze Test: Negative [de-identified] :  EXAM:  MR ANKLE LT\par \par \par PROCEDURE DATE:  12/15/2021\par \par \par \par INTERPRETATION:  Left ANKLE MRI\par \par CLINICAL INFORMATION: Left ankle swelling and pain, posterior tibial tendinitis, left ankle injury, question navicular stress fracture or posterior tibial tendon injury.\par TECHNIQUE: Multiplanar, multisequence MRI was obtained of the Left ankle.\par COMPARISON: X-ray left foot 12/2/2021..\par \par \par FINDINGS:\par \par MUSCLES AND TENDONS: There is thickening of the insertional fibers of posterior tibialis tendon with a small intrasubstance tear and moderate fluid surrounding the insertional fibers. Extensor and peroneal tendons of the ankle are normal in position, size, and signal. There is mild increased signal within the distal Achilles tendon without thickening or evidence of fluid-filled tear.\par LIGAMENTS: Marked attenuation of an intact ATFL suggesting chronic tearing and scar remodeling. The ATFL originates at a small chronically avulsed osseous fragment. The posterior talofibular ligament is intact. The calcaneofibular ligament is thickened suggestive of chronic tearing. Syndesmotic ligaments are intact. The medial ankle ligaments are intact but demonstrate mild increased signal. These findings are worse along the superficial fibers of the deltoid ligament.\par CARTILAGE and SUBCHONDRAL BONE: Relative preservation of the hyaline cartilage\par SYNOVIUM/JOINT FLUID: There is small fluid in the posterior tibiotalar and talocalcaneal recesses. Trace fluid in the posterior calcaneal recess.\par MARROW: No acute fracture. Well-corticated osseous density distal to the fibula likely the sequela of old trauma. Os trigonum is present. No osteonecrosis.\par PLANTAR FASCIA: Normal morphology and signal\par NEUROVASCULAR STRUCTURES: Intact.\par SINUS TARSI: Normal signal.\par PERIPHERAL/SUBCUTANEOUS SOFT TISSUES: No soft tissue swelling\par \par IMPRESSION:\par 1.  Moderate insertional posterior tibialis tendinosis with intrasubstance tearing and surrounding fluid.\par 2.  Remote ankle sprain. Possibly acute on chronic at the superficial deltoid fibers.\par \par --- End of Report ---\par \par \par PIPO RUSSELL MD; Resident Radiology\par This document has been electronically signed.\par EBONI SSOA MD; Attending Radiologist\par This document has been electronically signed. Dec 17 2021 12:28PM\par

## 2021-12-20 NOTE — ADDENDUM
[FreeTextEntry1] : I, Khushi Awad, acted solely as a scribe for Dr. Vishnu Conrad on this date 12/20/2021.\par \par All medical record entries made by the Scribe were at my, Dr. Vishnu Conrad, direction and personally dictated by me on 12/20/2021 . I have reviewed the chart and agree that the record accurately reflects my personal performance of the history, physical exam, assessment and plan. I have also personally directed, reviewed, and agreed with the chart.	\par

## 2021-12-20 NOTE — HISTORY OF PRESENT ILLNESS
[FreeTextEntry1] : 12/20/2021: The patient returns to the office to review MRI results of the left ankle. Pain is stated to be the same as her last evaluation.The patient presents wearing a CAM boot. She has no other complaints.\par \par 12/08/2021: The patient is a 26-year-old female who presents with acute on chronic left ankle pain.  The pain is mostly on the medial side of her ankle.  She started a weight loss challenge and has been performing CrossFit since July.  She lost a total of 45+ pounds but she is now experiencing severe medial sided ankle pain which she believes is from exercising.  She does have swelling on the medial side of her ankle/foot.  All of her pain is in that area.  She is walking with crutches.  She did go to the hospital where they took x-rays and gave her crutches.  They were concerned about a fracture in her ankle and foot.  Her pain scale today is a 7 out of 10.  She has not exercised since the injury for over 1 week.  She is concerned at this time.  She has no other complaints.

## 2021-12-20 NOTE — DISCUSSION/SUMMARY
[de-identified] : Today I had a lengthy discussion with the patient regarding their left ankle pain. I have addressed all the patient's concerns surrounding the pathology of their condition. MRI results were reviewed with the patient today in the clinic. I recommend the patient undergo a course of physical therapy for the left ankle  2-3 times a week for a total of 6-8 weeks. A prescription was given for the physical therapy today. with the guidance of physical therapy, I recommended that the patient begin to transition out of the CAM boot to an ASO brace as tolerated. The ASO brace was given today. She may WBAT. The patient understood and verbally agreed to the treatment plan. All of their questions were answered and they were satisfied with the visit. The patient should call the office if they have any questions or experience worsening symptoms. I would like to see the patient back in the office in 2-3 months PRN to reassess their condition. 				\par

## 2022-02-28 ENCOUNTER — APPOINTMENT (OUTPATIENT)
Dept: ORTHOPEDIC SURGERY | Facility: CLINIC | Age: 27
End: 2022-02-28

## 2023-01-24 NOTE — ED STATDOCS - NSTIMEPROVIDERCAREINITIATE_GEN_ER
mikhail- like to play [employer]. - belts:DISCUSSED    IMMUNIZATIONS:  The parents were counseled about each component of the vaccines, including possible side effects, risks and benefits.   Flu     Labs ordered today due to weight gain.     Snoring- with concerns of apnea   Service to ent.      30-Oct-2017 12:18

## 2023-02-11 ENCOUNTER — EMERGENCY (EMERGENCY)
Facility: HOSPITAL | Age: 28
LOS: 0 days | Discharge: HOME | End: 2023-02-11
Attending: EMERGENCY MEDICINE
Payer: MEDICAID

## 2023-02-11 VITALS
HEART RATE: 93 BPM | SYSTOLIC BLOOD PRESSURE: 123 MMHG | OXYGEN SATURATION: 98 % | TEMPERATURE: 98 F | RESPIRATION RATE: 20 BRPM | DIASTOLIC BLOOD PRESSURE: 87 MMHG

## 2023-02-11 VITALS — HEIGHT: 64 IN | WEIGHT: 293 LBS
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
## 2023-02-11 DIAGNOSIS — S92.911A UNSPECIFIED FRACTURE OF RIGHT TOE(S), INITIAL ENCOUNTER FOR CLOSED FRACTURE: ICD-10-CM

## 2023-02-11 DIAGNOSIS — M79.674 PAIN IN RIGHT TOE(S): ICD-10-CM

## 2023-02-11 PROCEDURE — 73630 X-RAY EXAM OF FOOT: CPT | Mod: RT

## 2023-02-11 PROCEDURE — 99284 EMERGENCY DEPT VISIT MOD MDM: CPT

## 2023-02-11 PROCEDURE — 73630 X-RAY EXAM OF FOOT: CPT | Mod: 26,RT

## 2023-02-11 PROCEDURE — 99283 EMERGENCY DEPT VISIT LOW MDM: CPT

## 2023-02-11 RX ORDER — ACETAMINOPHEN 500 MG
650 TABLET ORAL ONCE
Refills: 0 | Status: COMPLETED | OUTPATIENT
Start: 2023-02-11 | End: 2023-02-11

## 2023-02-11 RX ADMIN — Medication 650 MILLIGRAM(S): at 22:07

## 2023-02-11 NOTE — ED STATDOCS - PATIENT PORTAL LINK FT
You can access the FollowMyHealth Patient Portal offered by [facility] by registering at the following website: [email or web address]. By joining [employer]’s FollowMyHealth portal, you will also be able to view your health information using other applications (apps) compatible with our system.

## 2023-02-11 NOTE — ED STATDOCS - CROS ED ENDOCRINE ALL NEG
MILD DRY EYES: PRESCRIBED ARTIFICIAL TEARS BID - QID, OU RETURN FOR FOLLOW-UP AS SCHEDULED OR SOONER IF SYMPTOMS WORSEN. negative...

## 2023-02-11 NOTE — ED STATDOCS - NSFOLLOWUPINSTRUCTIONS_ED_ALL_ED_FT
Toe Fracture    A foot showing fractures in the bones of the first two toes.   A toe fracture is a break in one of the toe bones (phalanges). A toe fracture may be:  •A crack in the surface of the bone (stress fracture). This often occurs in athletes.      •A break all the way through the bone (complete fracture).        What are the causes?    This condition may be caused by:  •Direct impact, such as from dropping a heavy object on your toe.      •Stubbing your toe.      •Twisting or stretching your toe out of place.      •Overuse or repetitive exercise.        What increases the risk?    You are more likely to develop this condition if you:  •Play contact sports.      •Have a condition that causes the bones to become thin and brittle (osteoporosis).      •Have a low calcium level.        What are the signs or symptoms?  Bones and joints of the foot and toe showing a fracture and blood beneath the toenail.   The main symptoms of this condition are swelling and pain in the toe. Other symptoms include:  •Bruising.      •Stiffness.      •Numbness.      •A change in the way the toe looks.      •Broken bones that poke through the skin.      •Blood beneath the toenail.        How is this diagnosed?    This condition is diagnosed with a physical exam. You may also have X-rays.      How is this treated?    Treatment for this condition depends on the type of fracture and its severity. Treatment may include:  •Taping the broken toe to a toe that is next to it (elizabeth taping). This is the most common treatment for fractures in which the bone has not moved out of place (non-displaced fracture).      •Wearing a shoe that has a wide, rigid sole to protect the toe and to limit its movement.      •Wearing a walking cast.      •Having a procedure to move the toe back into place.    •Surgery. This may be needed if the:  •Pieces of broken bone are out of place (displaced).      •Bone breaks through the skin.        •Physical therapy. This is done to help regain movement and strength in the toe.      You may need follow-up X-rays to make sure that the bone is healing well and staying in position.      Follow these instructions at home:    If you have a shoe:     •Wear the shoe as told by your health care provider. Remove it only as told by your health care provider.       • Loosen the shoe if your toes tingle, become numb, or turn cold and blue.      •Keep the shoe clean and dry.      If you have a cast:     • Do not put pressure on any part of the cast until it is fully hardened. This may take several hours.      • Do not stick anything inside the cast to scratch your skin. Doing that increases your risk of infection.      •Check the skin around the cast every day. Tell your health care provider about any concerns.       • You may put lotion on dry skin around the edges of the cast. Do not put lotion on the skin underneath the cast.       •Keep the cast clean and dry.      Bathing     • Do not take baths, swim, or use a hot tub until your health care provider approves. Ask your health care provider if you can take showers.    •If the shoe or cast is not waterproof:  •Do not let it get wet.       •Cover it with a watertight covering when you take a bath or a shower.        Activity     • Do not use the injured foot to support your body weight until your health care provider says that you can. Use crutches as directed.    •Ask your health care provider:  •What activities are safe for you during recovery.      •What activities you need to avoid.        •Do physical therapy exercises as directed.      Driving     • Do not drive or use heavy machinery while taking pain medicine.      • Do not drive while wearing a cast on a foot that you use for driving.        Managing pain, stiffness, and swelling   A bag of ice on a towel on the skin. •If directed, put ice on painful areas:  •Put ice in a plastic bag.    •Place a towel between your skin and the bag.  •If you have a shoe, remove it as told by your health care provider.      •If you have a cast, place a towel between your cast and the bag.        •Leave the ice on for 20 minutes, 2–3 times per day.        •Raise (elevate) the injured area above the level of your heart while you are sitting or lying down.      General instructions   •If your toe was treated with elizabeth taping, follow your health care provider's instructions for changing the gauze and tape. Change it more often if:  •The gauze and tape get wet. If this happens, dry the space between the toes.      •The gauze and tape are too tight and cause your toe to become pale or numb.        •If you were not given a protective shoe, wear sturdy, supportive shoes. Your shoes should not pinch your toes and should not fit tightly against your toes.      • Do not use any products that contain nicotine or tobacco, such as cigarettes and e-cigarettes. These can delay bone healing. If you need help quitting, ask your health care provider.      •Take over-the-counter and prescription medicines only as told by your health care provider.      •Keep all follow-up visits as told by your health care provider. This is important.        Contact a health care provider if you have:    •Pain that gets worse or does not get better with medicine.      •A fever.      •A bad smell coming from your cast.        Get help right away if you have:  •Any of the following in your toes or your foot:  •Numbness that gets worse.      •Tingling.      •Coldness.      •Blue skin.        •Redness or swelling that gets worse.      •Pain that suddenly becomes severe.        Summary    •A toe fracture is a break in one of the toe bones (phalanges).      •Treatment depends on how severe your fracture is and how the pieces of the broken bone line up with each other. Treatment may include elizabeth taping, wearing a shoe or a cast, or using crutches.      •Ice and elevate your foot to help lessen the pain and swelling.      This information is not intended to replace advice given to you by your health care provider. Make sure you discuss any questions you have with your health care provider.      Document Revised: 05/23/2022 Document Reviewed: 05/23/2022    [employer] Patient Education © 2022 [employer]

## 2023-02-11 NOTE — ED STATDOCS - OBJECTIVE STATEMENT
28 yo F no significant PMhx presents with CC of toe injury.  Patient states she fell off 3rd step of ladder today, injured right pinky toe.  C/o pain, swelling of the right pinky toe.  Denies any other injuries.  No medications taken at home.

## 2023-02-11 NOTE — ED STATDOCS - CLINICAL SUMMARY MEDICAL DECISION MAKING FREE TEXT BOX
Question nondisplaced distal pinky toe fracture.  Will elizabeth tape.  D/c home with supportive care.

## 2023-02-11 NOTE — ED ADULT TRIAGE NOTE - CHIEF COMPLAINT QUOTE
patient complaining of R. 5th toe pain s/p fall off ladder. patient reports she fell backwards coming down from the 3rd step, denies head trauma, states her foot got caught. no meds taken PTA

## 2024-04-03 ENCOUNTER — EMERGENCY (EMERGENCY)
Facility: HOSPITAL | Age: 29
LOS: 0 days | Discharge: HOME | End: 2024-04-04
Attending: STUDENT IN AN ORGANIZED HEALTH CARE EDUCATION/TRAINING PROGRAM
Payer: MEDICAID

## 2024-04-03 VITALS — HEIGHT: 64 IN | WEIGHT: 293 LBS

## 2024-04-03 DIAGNOSIS — R10.10 UPPER ABDOMINAL PAIN, UNSPECIFIED: ICD-10-CM

## 2024-04-03 DIAGNOSIS — R10.13 EPIGASTRIC PAIN: ICD-10-CM

## 2024-04-03 DIAGNOSIS — Z90.49 ACQUIRED ABSENCE OF OTHER SPECIFIED PARTS OF DIGESTIVE TRACT: ICD-10-CM

## 2024-04-03 LAB
ALBUMIN SERPL ELPH-MCNC: 3.9 G/DL — SIGNIFICANT CHANGE UP (ref 3.3–5)
ALP SERPL-CCNC: 100 U/L — SIGNIFICANT CHANGE UP (ref 40–120)
ALT FLD-CCNC: 61 U/L — SIGNIFICANT CHANGE UP (ref 12–78)
ANION GAP SERPL CALC-SCNC: 7 MMOL/L — SIGNIFICANT CHANGE UP (ref 5–17)
AST SERPL-CCNC: 34 U/L — SIGNIFICANT CHANGE UP (ref 15–37)
BASOPHILS # BLD AUTO: 0.06 K/UL — SIGNIFICANT CHANGE UP (ref 0–0.2)
BASOPHILS NFR BLD AUTO: 0.4 % — SIGNIFICANT CHANGE UP (ref 0–2)
BILIRUB SERPL-MCNC: 0.7 MG/DL — SIGNIFICANT CHANGE UP (ref 0.2–1.2)
BUN SERPL-MCNC: 8 MG/DL — SIGNIFICANT CHANGE UP (ref 7–23)
CALCIUM SERPL-MCNC: 9.4 MG/DL — SIGNIFICANT CHANGE UP (ref 8.5–10.1)
CHLORIDE SERPL-SCNC: 106 MMOL/L — SIGNIFICANT CHANGE UP (ref 96–108)
CO2 SERPL-SCNC: 25 MMOL/L — SIGNIFICANT CHANGE UP (ref 22–31)
CREAT SERPL-MCNC: 0.87 MG/DL — SIGNIFICANT CHANGE UP (ref 0.5–1.3)
EGFR: 94 ML/MIN/1.73M2 — SIGNIFICANT CHANGE UP
EOSINOPHIL # BLD AUTO: 0.02 K/UL — SIGNIFICANT CHANGE UP (ref 0–0.5)
EOSINOPHIL NFR BLD AUTO: 0.1 % — SIGNIFICANT CHANGE UP (ref 0–6)
GLUCOSE SERPL-MCNC: 110 MG/DL — HIGH (ref 70–99)
HCT VFR BLD CALC: 44 % — SIGNIFICANT CHANGE UP (ref 34.5–45)
HGB BLD-MCNC: 14.4 G/DL — SIGNIFICANT CHANGE UP (ref 11.5–15.5)
IMM GRANULOCYTES NFR BLD AUTO: 0.5 % — SIGNIFICANT CHANGE UP (ref 0–0.9)
LIDOCAIN IGE QN: 25 U/L — SIGNIFICANT CHANGE UP (ref 13–75)
LYMPHOCYTES # BLD AUTO: 21.2 % — SIGNIFICANT CHANGE UP (ref 13–44)
LYMPHOCYTES # BLD AUTO: 3.33 K/UL — HIGH (ref 1–3.3)
MCHC RBC-ENTMCNC: 28.5 PG — SIGNIFICANT CHANGE UP (ref 27–34)
MCHC RBC-ENTMCNC: 32.7 GM/DL — SIGNIFICANT CHANGE UP (ref 32–36)
MCV RBC AUTO: 87 FL — SIGNIFICANT CHANGE UP (ref 80–100)
MONOCYTES # BLD AUTO: 1.09 K/UL — HIGH (ref 0–0.9)
MONOCYTES NFR BLD AUTO: 6.9 % — SIGNIFICANT CHANGE UP (ref 2–14)
NEUTROPHILS # BLD AUTO: 11.11 K/UL — HIGH (ref 1.8–7.4)
NEUTROPHILS NFR BLD AUTO: 70.9 % — SIGNIFICANT CHANGE UP (ref 43–77)
PLATELET # BLD AUTO: 421 K/UL — HIGH (ref 150–400)
POCT URINE PREGNANCY TEST: NEGATIVE — SIGNIFICANT CHANGE UP
POTASSIUM SERPL-MCNC: 3.9 MMOL/L — SIGNIFICANT CHANGE UP (ref 3.5–5.3)
POTASSIUM SERPL-SCNC: 3.9 MMOL/L — SIGNIFICANT CHANGE UP (ref 3.5–5.3)
PROT SERPL-MCNC: 8.6 GM/DL — HIGH (ref 6–8.3)
RBC # BLD: 5.06 M/UL — SIGNIFICANT CHANGE UP (ref 3.8–5.2)
RBC # FLD: 13.7 % — SIGNIFICANT CHANGE UP (ref 10.3–14.5)
SODIUM SERPL-SCNC: 138 MMOL/L — SIGNIFICANT CHANGE UP (ref 135–145)
WBC # BLD: 15.69 K/UL — HIGH (ref 3.8–10.5)
WBC # FLD AUTO: 15.69 K/UL — HIGH (ref 3.8–10.5)

## 2024-04-03 PROCEDURE — 99284 EMERGENCY DEPT VISIT MOD MDM: CPT | Mod: 25

## 2024-04-03 PROCEDURE — 76705 ECHO EXAM OF ABDOMEN: CPT

## 2024-04-03 PROCEDURE — 81025 URINE PREGNANCY TEST: CPT

## 2024-04-03 PROCEDURE — 96374 THER/PROPH/DIAG INJ IV PUSH: CPT

## 2024-04-03 PROCEDURE — 76705 ECHO EXAM OF ABDOMEN: CPT | Mod: 26

## 2024-04-03 PROCEDURE — 99285 EMERGENCY DEPT VISIT HI MDM: CPT

## 2024-04-03 PROCEDURE — 74177 CT ABD & PELVIS W/CONTRAST: CPT | Mod: MC

## 2024-04-03 PROCEDURE — 36415 COLL VENOUS BLD VENIPUNCTURE: CPT

## 2024-04-03 PROCEDURE — C9113: CPT

## 2024-04-03 PROCEDURE — 83690 ASSAY OF LIPASE: CPT

## 2024-04-03 PROCEDURE — 74177 CT ABD & PELVIS W/CONTRAST: CPT | Mod: 26,MC

## 2024-04-03 PROCEDURE — 80053 COMPREHEN METABOLIC PANEL: CPT

## 2024-04-03 PROCEDURE — 85025 COMPLETE CBC W/AUTO DIFF WBC: CPT

## 2024-04-03 RX ORDER — FAMOTIDINE 10 MG/ML
20 INJECTION INTRAVENOUS DAILY
Refills: 0 | Status: DISCONTINUED | OUTPATIENT
Start: 2024-04-03 | End: 2024-04-04

## 2024-04-03 RX ORDER — PANTOPRAZOLE SODIUM 20 MG/1
80 TABLET, DELAYED RELEASE ORAL ONCE
Refills: 0 | Status: COMPLETED | OUTPATIENT
Start: 2024-04-03 | End: 2024-04-03

## 2024-04-03 RX ORDER — SUCRALFATE 1 G
1 TABLET ORAL ONCE
Refills: 0 | Status: COMPLETED | OUTPATIENT
Start: 2024-04-03 | End: 2024-04-03

## 2024-04-03 RX ORDER — LIDOCAINE 4 G/100G
15 CREAM TOPICAL ONCE
Refills: 0 | Status: COMPLETED | OUTPATIENT
Start: 2024-04-03 | End: 2024-04-03

## 2024-04-03 RX ADMIN — FAMOTIDINE 20 MILLIGRAM(S): 10 INJECTION INTRAVENOUS at 18:55

## 2024-04-03 RX ADMIN — Medication 30 MILLILITER(S): at 18:55

## 2024-04-03 RX ADMIN — LIDOCAINE 15 MILLILITER(S): 4 CREAM TOPICAL at 21:03

## 2024-04-03 RX ADMIN — PANTOPRAZOLE SODIUM 80 MILLIGRAM(S): 20 TABLET, DELAYED RELEASE ORAL at 21:04

## 2024-04-03 RX ADMIN — Medication 1 GRAM(S): at 18:55

## 2024-04-03 NOTE — ED ADULT TRIAGE NOTE - CHIEF COMPLAINT QUOTE
ambulatory into ED for "severe upper stomach pain since last night". pt endorsing diarrhea and belching. denies fevers but reports she feels cold. denies urinary symptoms.

## 2024-04-03 NOTE — ED STATDOCS - CARE PROVIDER_API CALL
[facility]  Gastroenterology  [address] [place] [id number]  Phone: [phone]  Follow Up Time:

## 2024-04-03 NOTE — ED STATDOCS - ATTENDING APP SHARED VISIT CONTRIBUTION OF CARE
I, Kai Riggs, DO personally saw the patient with JUDIE.  I have personally performed a face to face diagnostic evaluation on this patient.  I have reviewed the JUDIE note and agree with the history, exam, and plan of care, except as noted.  I personally saw the patient and performed a substantive portion of the visit including all aspects of the medical decision making.

## 2024-04-03 NOTE — ED STATDOCS - OBJECTIVE STATEMENT
29 y/o female with PMHx of appendectomy presents to the ED c/o upper abdominal pain since last night that comes in waves, exacerbated by eating, moving to the right. Pt denies hx of GERD, gallbladder issues. Pt tried taking tums without relief. Pt denies frequent etoh use, however does eat lots of spicy foods.  LMP 3/11/24

## 2024-04-03 NOTE — ED STATDOCS - PATIENT PORTAL LINK FT
You can access the FollowMyHealth Patient Portal offered by  by registering at the following website: [email or web address]. By joining [employer]’s FollowMyHealth portal, you will also be able to view your health information using other applications (apps) compatible with our system.

## 2024-04-03 NOTE — ED STATDOCS - NSTIMEPROVIDERCAREINITIATE_GEN_ER
03-Apr-2024 17:58 Graft Donor Site Bandage (Optional-Leave Blank If You Don't Want In Note): Steri-strips and a pressure bandage were applied to the donor site.

## 2024-04-03 NOTE — ED ADULT NURSE NOTE - OBJECTIVE STATEMENT
Reason for Disposition   Caller has nonurgent medication question about med that PCP prescribed and triager unable to answer question    Protocols used: ST MEDICATION QUESTION CALL-P-AH    Grandmother calling with concerns of medication prescribed by ED MD.  Keflex 500 mg is to be given 4 times a day and Pt goes to school.  Note was given to school and school told Grandmother, medication could not be given by school nurse because it is not a medication he constantly needs.  Grandmother wants to speak to Dr. Lamar about medication schedule.  Will message/unable to leave message.  Called and left message with staff member at Dr. Samina Lamar's office  
abdominal pain epigastric intermittent feels like pressure and a twisting.  Pain states that she has belching after pain.

## 2024-04-04 VITALS
SYSTOLIC BLOOD PRESSURE: 110 MMHG | RESPIRATION RATE: 16 BRPM | DIASTOLIC BLOOD PRESSURE: 75 MMHG | TEMPERATURE: 98 F | HEART RATE: 98 BPM | OXYGEN SATURATION: 98 %

## 2024-04-22 NOTE — ED ADULT NURSE NOTE - CAS DISCH CONDITION
Caller: Mary Albarran LAKESHA    Relationship: Self    Best call back number: [phone]     Requested Prescriptions:   Requested Prescriptions     Pending Prescriptions Disp Refills    HYDROcodone-acetaminophen (NORCO)  MG per tablet 120 tablet 0     Sig: Take 1 tablet by mouth Every 4 (Four) Hours As Needed for Moderate Pain or Severe Pain.    albuterol sulfate  (90 Base) MCG/ACT inhaler 8 g 11     Sig: Inhale 2 puffs Every 4 (Four) Hours As Needed for Wheezing.        Pharmacy where request should be sent: [facility] [address] [phone] [facility] [phone]      Last office visit with prescribing clinician: 2/16/2024   Last telemedicine visit with prescribing clinician: Visit date not found   Next office visit with prescribing clinician: Visit date not found     Additional details provided by patient: PATIENT WOULD LIKE A 20 DAY SUPPLY    Does the patient have less than a 3 day supply:  [] Yes  [] No    Would you like a call back once the refill request has been completed: [] Yes [] No    If the office needs to give you a call back, can they leave a voicemail: [] Yes [] No    Steven Hollins Rep   04/22/24 10:12 EDT   
Stable

## 2024-05-23 NOTE — ED STATDOCS - WR ORDER NAME 1
Airway  Date/Time: 5/23/2024 11:54 AM  Urgency: elective    Airway not difficult    General Information and Staff    Patient location during procedure: OR  Anesthesiologist: Dimple Mistry MD  Performed: anesthesiologist   Performed by: Dimple Mistry MD  Authorized by: Dimple Mistry MD      Indications and Patient Condition  Indications for airway management: anesthesia  Spontaneous Ventilation: absent  Sedation level: deep  Preoxygenated: yes  Patient position: sniffing  Mask difficulty assessment: 0 - not attempted    Final Airway Details  Final airway type: endotracheal airway      Successful airway: ETT  Cuffed: yes   Successful intubation technique: direct laryngoscopy  Facilitating devices/methods: intubating stylet  Endotracheal tube insertion site: oral  Blade: Wallace  Blade size: #3  ETT size (mm): 7.0    Cormack-Lehane Classification: grade I - full view of glottis  Placement verified by: capnometry   Measured from: lips  ETT to lips (cm): 21  Number of attempts at approach: 1        
Xray Foot AP + Lateral + Oblique, Right

## 2024-09-11 ENCOUNTER — APPOINTMENT (OUTPATIENT)
Dept: OBGYN | Facility: CLINIC | Age: 29
End: 2024-09-11